# Patient Record
Sex: MALE | Race: BLACK OR AFRICAN AMERICAN | HISPANIC OR LATINO | URBAN - METROPOLITAN AREA
[De-identification: names, ages, dates, MRNs, and addresses within clinical notes are randomized per-mention and may not be internally consistent; named-entity substitution may affect disease eponyms.]

---

## 2017-07-05 ENCOUNTER — EMERGENCY (EMERGENCY)
Facility: HOSPITAL | Age: 32
LOS: 1 days | Discharge: HOME | End: 2017-07-05
Attending: EMERGENCY MEDICINE
Payer: MEDICAID

## 2017-07-05 VITALS
DIASTOLIC BLOOD PRESSURE: 80 MMHG | WEIGHT: 212.97 LBS | SYSTOLIC BLOOD PRESSURE: 128 MMHG | HEART RATE: 61 BPM | OXYGEN SATURATION: 99 % | RESPIRATION RATE: 18 BRPM | HEIGHT: 71 IN | TEMPERATURE: 98 F

## 2017-07-05 PROCEDURE — 99283 EMERGENCY DEPT VISIT LOW MDM: CPT | Mod: 25

## 2017-07-05 PROCEDURE — 73502 X-RAY EXAM HIP UNI 2-3 VIEWS: CPT | Mod: 26,RT

## 2017-07-05 PROCEDURE — 73502 X-RAY EXAM HIP UNI 2-3 VIEWS: CPT

## 2017-07-05 RX ORDER — IBUPROFEN 200 MG
1 TABLET ORAL
Qty: 21 | Refills: 0 | OUTPATIENT
Start: 2017-07-05 | End: 2017-07-12

## 2017-07-05 NOTE — ED STATDOCS - PROGRESS NOTE DETAILS
iSTOP: No +. This report was requested by: Walter Young | Reference #: [id number] pt's xray non-contributory and he will be discharged

## 2017-07-05 NOTE — ED STATDOCS - MUSCULOSKELETAL, MLM
pain upon external and internal rotation of the hip. limp noted while ambulating pain upon external and internal rotation of the R hip. limp noted while ambulating

## 2017-07-05 NOTE — ED ADULT NURSE REASSESSMENT NOTE - NS ED NURSE REASSESS COMMENT FT1
pt to be discharged home with follow up services to Ortho sx. Pt given results of xray and educated on discharge instructions

## 2017-07-05 NOTE — ED STATDOCS - OBJECTIVE STATEMENT
30 y/o M w/ PMHx of R hip leg break and dislocation presents to the ED c/o constant/throbbing R hip pain onset today. Pt states he cannot lay on the R hip and he has difficulty ambulating  surgery. He has never seen a doctor for the pain. Pt has plates, screws, rods present in the R leg. Pt denies trauma, fevers 32 y/o M w/ PMHx of R hip break and dislocation presents to the ED c/o worsening constant/throbbing R hip pain x3 years. Pt states he cannot lay on the R hip and the pain is worse when he ambulates. He has never seen a doctor for the pain. He has plates, screws, rods present in the R leg. Pt denies trauma, fevers or any other complaints at this time. 30 y/o M w/ PMHx of R hip break and dislocation presents to the ED c/o worsening constant/throbbing R hip pain x3 years. Pt states he cannot lay on the R hip and the pain is worse when he ambulates. He has never seen a doctor for the pain. He has plates, screws, and rods present in the R leg. Pt denies trauma, fevers or any other complaints at this time. 30 y/o M w/ PMHx of R hip fx and dislocation presents to the ED c/o worsening constant/throbbing R hip pain x3 years. Pt states he cannot lay on the R hip and the pain is worse when he ambulates. He has never seen a doctor for the pain. He has plates, screws, and rods present in the R leg. Pt denies trauma, fevers or any other complaints at this time.

## 2017-07-05 NOTE — ED ADULT NURSE NOTE - OBJECTIVE STATEMENT
pt axpx3 c/o right sided hip pain. Pt denies any recent injuries and states he woke up this morning with severe pain, pt states he had sx on right hipwhen he was 14 years old and thinks it may have something to do with that. Pt gait steady and able to ambulate without any assitance.

## 2017-07-05 NOTE — ED ADULT TRIAGE NOTE - CHIEF COMPLAINT QUOTE
'I have right hip pain, I can't walk on it sometimes, I had surgery on my hip here when I was about 13 or 14" Pt denies recent injury. Pt A & Ox3, respirations even & unlabored.

## 2017-08-12 ENCOUNTER — EMERGENCY (EMERGENCY)
Facility: HOSPITAL | Age: 32
LOS: 1 days | Discharge: HOME | End: 2017-08-12
Attending: EMERGENCY MEDICINE
Payer: MEDICAID

## 2017-08-12 VITALS
RESPIRATION RATE: 18 BRPM | SYSTOLIC BLOOD PRESSURE: 143 MMHG | WEIGHT: 212.97 LBS | HEIGHT: 71 IN | DIASTOLIC BLOOD PRESSURE: 89 MMHG | TEMPERATURE: 100 F | OXYGEN SATURATION: 100 % | HEART RATE: 93 BPM

## 2017-08-12 PROCEDURE — 99284 EMERGENCY DEPT VISIT MOD MDM: CPT | Mod: 25

## 2017-08-13 VITALS
HEART RATE: 88 BPM | RESPIRATION RATE: 18 BRPM | OXYGEN SATURATION: 100 % | DIASTOLIC BLOOD PRESSURE: 81 MMHG | TEMPERATURE: 99 F | SYSTOLIC BLOOD PRESSURE: 141 MMHG

## 2017-08-13 LAB
ANION GAP SERPL CALC-SCNC: 12 MMOL/L — SIGNIFICANT CHANGE UP (ref 5–17)
ANISOCYTOSIS BLD QL: SLIGHT — SIGNIFICANT CHANGE UP
BUN SERPL-MCNC: 9 MG/DL — SIGNIFICANT CHANGE UP (ref 8–20)
CALCIUM SERPL-MCNC: 8.8 MG/DL — SIGNIFICANT CHANGE UP (ref 8.6–10.2)
CHLORIDE SERPL-SCNC: 100 MMOL/L — SIGNIFICANT CHANGE UP (ref 98–107)
CO2 SERPL-SCNC: 27 MMOL/L — SIGNIFICANT CHANGE UP (ref 22–29)
CREAT SERPL-MCNC: 0.89 MG/DL — SIGNIFICANT CHANGE UP (ref 0.5–1.3)
CRP SERPL-MCNC: 2.3 MG/DL — HIGH (ref 0–0.4)
DACRYOCYTES BLD QL SMEAR: SLIGHT — SIGNIFICANT CHANGE UP
EOSINOPHIL # BLD AUTO: 0.2 K/UL — SIGNIFICANT CHANGE UP (ref 0–0.5)
EOSINOPHIL NFR BLD AUTO: 1.6 % — SIGNIFICANT CHANGE UP (ref 0–5)
ERYTHROCYTE [SEDIMENTATION RATE] IN BLOOD: 5 MM/HR — SIGNIFICANT CHANGE UP (ref 0–20)
GLUCOSE SERPL-MCNC: 103 MG/DL — SIGNIFICANT CHANGE UP (ref 70–115)
HCT VFR BLD CALC: 43.1 % — SIGNIFICANT CHANGE UP (ref 42–52)
HGB BLD-MCNC: 13.7 G/DL — LOW (ref 14–18)
LYMPHOCYTES # BLD AUTO: 2.6 K/UL — SIGNIFICANT CHANGE UP (ref 1–4.8)
LYMPHOCYTES # BLD AUTO: 26.6 % — SIGNIFICANT CHANGE UP (ref 20–55)
MACROCYTES BLD QL: SLIGHT — SIGNIFICANT CHANGE UP
MCHC RBC-ENTMCNC: 23.2 PG — LOW (ref 27–31)
MCHC RBC-ENTMCNC: 31.8 G/DL — LOW (ref 32–36)
MCV RBC AUTO: 72.9 FL — LOW (ref 80–94)
MICROCYTES BLD QL: SLIGHT — SIGNIFICANT CHANGE UP
MONOCYTES # BLD AUTO: 0.9 K/UL — HIGH (ref 0–0.8)
MONOCYTES NFR BLD AUTO: 9.2 % — SIGNIFICANT CHANGE UP (ref 3–10)
NEUTROPHILS # BLD AUTO: 6 K/UL — SIGNIFICANT CHANGE UP (ref 1.8–8)
NEUTROPHILS NFR BLD AUTO: 62.4 % — SIGNIFICANT CHANGE UP (ref 37–73)
OVALOCYTES BLD QL SMEAR: SLIGHT — SIGNIFICANT CHANGE UP
PLAT MORPH BLD: NORMAL — SIGNIFICANT CHANGE UP
PLATELET # BLD AUTO: 243 K/UL — SIGNIFICANT CHANGE UP (ref 150–400)
POIKILOCYTOSIS BLD QL AUTO: SLIGHT — SIGNIFICANT CHANGE UP
POTASSIUM SERPL-MCNC: 4 MMOL/L — SIGNIFICANT CHANGE UP (ref 3.5–5.3)
POTASSIUM SERPL-SCNC: 4 MMOL/L — SIGNIFICANT CHANGE UP (ref 3.5–5.3)
RBC # BLD: 5.91 M/UL — SIGNIFICANT CHANGE UP (ref 4.6–6.2)
RBC # FLD: 14.6 % — SIGNIFICANT CHANGE UP (ref 11–15.6)
RBC BLD AUTO: ABNORMAL
SODIUM SERPL-SCNC: 139 MMOL/L — SIGNIFICANT CHANGE UP (ref 135–145)
WBC # BLD: 9.6 K/UL — SIGNIFICANT CHANGE UP (ref 4.8–10.8)
WBC # FLD AUTO: 9.6 K/UL — SIGNIFICANT CHANGE UP (ref 4.8–10.8)

## 2017-08-13 PROCEDURE — 76882 US LMTD JT/FCL EVL NVASC XTR: CPT

## 2017-08-13 PROCEDURE — 99284 EMERGENCY DEPT VISIT MOD MDM: CPT | Mod: 25

## 2017-08-13 PROCEDURE — 85652 RBC SED RATE AUTOMATED: CPT

## 2017-08-13 PROCEDURE — 80048 BASIC METABOLIC PNL TOTAL CA: CPT

## 2017-08-13 PROCEDURE — 73080 X-RAY EXAM OF ELBOW: CPT

## 2017-08-13 PROCEDURE — 76882 US LMTD JT/FCL EVL NVASC XTR: CPT | Mod: 26,RT

## 2017-08-13 PROCEDURE — 85027 COMPLETE CBC AUTOMATED: CPT

## 2017-08-13 PROCEDURE — 86140 C-REACTIVE PROTEIN: CPT

## 2017-08-13 PROCEDURE — 36415 COLL VENOUS BLD VENIPUNCTURE: CPT

## 2017-08-13 PROCEDURE — 73080 X-RAY EXAM OF ELBOW: CPT | Mod: 26,RT

## 2017-08-13 NOTE — ED PROVIDER NOTE - NS ED ROS FT
Constitutional: - Fever, - Chills, - Anorexia, - Fatigue  MSK: + Myalgias RU extremity, + Arthralgias RU extremity, - Weakness, - Deformities, - Injuries  SKIN: - Color change, - Rash, + Swelling RU extremity, - Bruises, - Wounds  NEURO: - Change in behavior, - Dec. Alertness, - Headache, - Dizziness, - Change in speech, - Weakness, - Seizure-like activity

## 2017-08-13 NOTE — ED PROVIDER NOTE - PHYSICAL EXAMINATION
GEN: Awake, alert, interactive, NAD, non-toxic appearing.   NEURO: AOx3, CN II-XII grossly intact without focal deficit.   MSK: Moving all extremities with no apparent deformities.   SKIN: Warm, dry, normal color, without apparent rashes. + edema to RUE just proximal to elbow without erythema, warmth. FROM to elbow and shoulder.

## 2017-08-13 NOTE — ED PROVIDER NOTE - ATTENDING CONTRIBUTION TO CARE
31y old with sq injection, has a fluctuance, distal sensory, motor , capillary intact, plan to do us, educate patient on injection and sites, I personally saw the patient with the NP, and completed the key components of the history and physical exam. I then discussed the management plan with the NP

## 2017-08-13 NOTE — ED PROVIDER NOTE - OBJECTIVE STATEMENT
this is a 30 yo male, reportedly healthy, who reports he injects testosterone for weight lifting, last injected into his right bicep, now with swelling and mild discomfort to distal humerus and elbow without redness, warmth, fever, DROM. states took advil without relief. states he normally injects into his back without complication.

## 2017-08-13 NOTE — ED PROVIDER NOTE - MEDICAL DECISION MAKING DETAILS
32 yo male with edema just superior to right elbow after injecting testosterone. will US and eval labs to help differentiate for effusion vs abscess vs cyst, will xray for retained FB and bony involvement, low likelihood for septic joint as pt with FROM. likely sling and fu. 32 yo male with edema just superior to right elbow after injecting testosterone. will US and eval labs to help differentiate for effusion vs abscess vs cyst, will xray for retained FB and bony involvement, low likelihood for septic joint as pt with FROM. Discharged

## 2017-08-13 NOTE — ED PROVIDER NOTE - CARE PLAN
Principal Discharge DX:	Pain of right upper extremity  Secondary Diagnosis:	Edema of extremity of unknown cause

## 2017-08-13 NOTE — ED ADULT NURSE NOTE - OBJECTIVE STATEMENT
Pt. presents to ED with c/o right arm swelling and pain. Pt. has been self-injecting with testosterone in arms, admits he may have missed the muscle on last injection. right lateral arm is swollen reddened, pt. denies any other drug use of medical conditions.

## 2024-02-23 NOTE — ED ADULT TRIAGE NOTE - MEANS OF ARRIVAL
Continued Stay SW/CM Assessment/Plan of Care Note       Active Substitute Decision Maker (SDM)    There are no active Substitute Decision Maker (SDM) on file.           Progress note:  Met with patient at bedside. Patient has clothes on and pacing outside of room. States he is ready to leave. Made aware of CM role. Able to discuss on PCP information only, information updated in chart. Patient has cardiology appointment scheduled.     See SW/CM flowsheets for other objective data.    Disposition Recommendations:  Preliminary discharge destination:  home  SW/CM recommendation for discharge:  home    Destination Pharmacy:        Discharge Plan/Needs:     Continued Care and Services - Admitted Since 2/22/2024    Coordination has not been started for this encounter.           Devices/ Equipment that need to be arranged for discharge:  none    Prior To Hospitalization:    Living Situation: Spouse, Children and residing at House    .  Support Systems: Spouse, Family members   Home Devices/Equipment: None            Mobility Assist Devices: None   Type of Service Prior to Hospitalization: None               Patient/Family discharge goal (s):        Resources provided:           Therapy Recommendations for Discharge:   PT:      Last Filed Values       None          OT:       Last Filed Values       None          SLP:    Last Filed Values       None            Mobility Equipment Recommended for Discharge:        Barriers to Discharge  Identified Barriers to Discharge/Transition Planning:                    
A&Ox4, IV removed, discharge instructions given, belongings returned.   Problem: Pain  Goal: Acceptable pain level achieved/maintained at rest using appropriate pain scale for the patient  Outcome: Met, complete goal  Goal: Acceptable pain level achieved/maintained with activity using appropriate pain scale for the patient  Outcome: Met, complete goal  Goal: Acceptable pain level achieved/maintained without oversedation  Outcome: Met, complete goal     
R radial site remained clean, dry, and intact throughout shift. No complaints of pain, numbness, or tingling. Patient refusing heparin d/t fear of needles.    Problem: Pain  Goal: Acceptable pain level achieved/maintained at rest using appropriate pain scale for the patient  2/23/2024 0535 by Shirley Ellis RN  Outcome: Monitoring/Evaluating progress  2/23/2024 0535 by Shirley Ellis RN  Outcome: Monitoring/Evaluating progress  Goal: Acceptable pain level achieved/maintained with activity using appropriate pain scale for the patient  2/23/2024 0535 by Shirley Ellis RN  Outcome: Monitoring/Evaluating progress  2/23/2024 0535 by Shirley Ellis RN  Outcome: Monitoring/Evaluating progress  Goal: Acceptable pain level achieved/maintained without oversedation  2/23/2024 0535 by Shirley Ellis RN  Outcome: Monitoring/Evaluating progress  2/23/2024 0535 by Shirley Ellis RN  Outcome: Monitoring/Evaluating progress     
ambulatory